# Patient Record
Sex: MALE | Race: BLACK OR AFRICAN AMERICAN | NOT HISPANIC OR LATINO | URBAN - METROPOLITAN AREA
[De-identification: names, ages, dates, MRNs, and addresses within clinical notes are randomized per-mention and may not be internally consistent; named-entity substitution may affect disease eponyms.]

---

## 2023-10-30 PROBLEM — Z86.39 HISTORY OF OBESITY: Status: ACTIVE | Noted: 2023-10-30

## 2023-10-30 PROBLEM — K59.09 CHRONIC CONSTIPATION: Status: ACTIVE | Noted: 2023-10-30

## 2023-10-30 PROBLEM — J30.9 ALLERGIC RHINITIS: Status: ACTIVE | Noted: 2023-10-30

## 2023-10-30 PROBLEM — F84.0 ACTIVE AUTISTIC DISORDER (HHS-HCC): Status: ACTIVE | Noted: 2023-10-30

## 2023-10-30 PROBLEM — H17.9 LEFT CORNEA SCAR: Status: ACTIVE | Noted: 2023-10-30

## 2023-10-30 PROBLEM — F71 MODERATE INTELLECTUAL DISABILITY: Status: ACTIVE | Noted: 2023-10-30

## 2023-10-30 PROBLEM — J45.909 ASTHMA (HHS-HCC): Status: ACTIVE | Noted: 2023-10-30

## 2023-10-30 PROBLEM — E66.9 OBESITY: Status: ACTIVE | Noted: 2023-10-30

## 2023-10-30 PROBLEM — H52.203 MYOPIA OF BOTH EYES WITH ASTIGMATISM: Status: ACTIVE | Noted: 2023-10-30

## 2023-10-30 PROBLEM — L23.9 CONTACT DERMATITIS, ALLERGIC: Status: ACTIVE | Noted: 2023-10-30

## 2023-10-30 PROBLEM — H52.13 MYOPIA OF BOTH EYES WITH ASTIGMATISM: Status: ACTIVE | Noted: 2023-10-30

## 2023-10-30 RX ORDER — TRIAMCINOLONE ACETONIDE 1 MG/G
CREAM TOPICAL
COMMUNITY
Start: 2015-10-28 | End: 2023-12-29 | Stop reason: WASHOUT

## 2023-10-30 RX ORDER — KETOCONAZOLE 20 MG/ML
SHAMPOO, SUSPENSION TOPICAL
COMMUNITY
Start: 2016-07-01 | End: 2023-12-29 | Stop reason: WASHOUT

## 2023-10-30 RX ORDER — CETIRIZINE HYDROCHLORIDE 10 MG/1
TABLET ORAL
COMMUNITY
Start: 2015-10-28 | End: 2023-12-29 | Stop reason: WASHOUT

## 2023-11-17 ENCOUNTER — APPOINTMENT (OUTPATIENT)
Dept: PRIMARY CARE | Facility: CLINIC | Age: 33
End: 2023-11-17
Payer: COMMERCIAL

## 2023-11-18 ENCOUNTER — OFFICE VISIT (OUTPATIENT)
Dept: OPHTHALMOLOGY | Facility: CLINIC | Age: 33
End: 2023-11-18
Payer: COMMERCIAL

## 2023-11-18 DIAGNOSIS — F84.0 ACTIVE AUTISTIC DISORDER (HHS-HCC): ICD-10-CM

## 2023-11-18 DIAGNOSIS — H17.9 LEFT CORNEA SCAR: Primary | ICD-10-CM

## 2023-11-18 DIAGNOSIS — H52.13 MYOPIA OF BOTH EYES WITH ASTIGMATISM: ICD-10-CM

## 2023-11-18 DIAGNOSIS — H52.203 MYOPIA OF BOTH EYES WITH ASTIGMATISM: ICD-10-CM

## 2023-11-18 PROCEDURE — 92014 COMPRE OPH EXAM EST PT 1/>: CPT | Performed by: OPTOMETRIST

## 2023-11-18 PROCEDURE — 92015 DETERMINE REFRACTIVE STATE: CPT | Performed by: OPTOMETRIST

## 2023-11-18 ASSESSMENT — REFRACTION
OS_SPHERE: -0.75
OD_CYLINDER: -2.50
OS_CYLINDER: -2.50
OD_SPHERE: -3.00
OD_AXIS: 180
OS_AXIS: 180

## 2023-11-18 ASSESSMENT — EXTERNAL EXAM - RIGHT EYE: OD_EXAM: NORMAL

## 2023-11-18 ASSESSMENT — VISUAL ACUITY
METHOD: SNELLEN - LINEAR
OD_CC: 20/40
OS_CC: 20/50
CORRECTION_TYPE: GLASSES
OD_CC+: -1

## 2023-11-18 ASSESSMENT — ENCOUNTER SYMPTOMS
CONSTITUTIONAL NEGATIVE: 0
HEMATOLOGIC/LYMPHATIC NEGATIVE: 0
ENDOCRINE NEGATIVE: 0
ALLERGIC/IMMUNOLOGIC NEGATIVE: 0
RESPIRATORY NEGATIVE: 0
PSYCHIATRIC NEGATIVE: 0
GASTROINTESTINAL NEGATIVE: 0
CARDIOVASCULAR NEGATIVE: 0
NEUROLOGICAL NEGATIVE: 0
MUSCULOSKELETAL NEGATIVE: 0
EYES NEGATIVE: 0

## 2023-11-18 ASSESSMENT — EXTERNAL EXAM - LEFT EYE: OS_EXAM: NORMAL

## 2023-11-18 ASSESSMENT — CONF VISUAL FIELD: COMMENTS: UNABLE TO TEST

## 2023-11-18 ASSESSMENT — REFRACTION_MANIFEST
OD_AXIS: 180
OD_SPHERE: -3.00
OS_AXIS: 180
OS_CYLINDER: -2.50
OS_SPHERE: -0.75
OD_CYLINDER: -2.00

## 2023-11-18 ASSESSMENT — SLIT LAMP EXAM - LIDS
COMMENTS: NORMAL
COMMENTS: NORMAL

## 2023-11-18 ASSESSMENT — TONOMETRY
OD_IOP_MMHG: 13
OS_IOP_MMHG: 12
IOP_METHOD: TONOPEN

## 2023-11-18 ASSESSMENT — REFRACTION_WEARINGRX
OD_SPHERE: -3.00
OD_CYLINDER: -2.00
OD_AXIS: 180
OS_CYLINDER: -2.50
OS_AXIS: 180
OS_SPHERE: -0.75

## 2023-11-18 ASSESSMENT — CUP TO DISC RATIO
OD_RATIO: 0.4
OS_RATIO: 0.4

## 2023-11-18 NOTE — PROGRESS NOTES
Reduced VA OU with healthy fundus and no cataract.  Has scar OS and 6 years ago VA was tested at 20/20 OD and OS despite scar present OS.  It is possible that VA was not recorded correctly in 2016.  It is possible that VA is changing.  Pt does not fixate according to instructions during the exam and may lack cognition to get good VA reading.  A spectacle prescription was given at the patient`s request.  VA today tested at 20/40 right eye (OD) 20/50 left eye (OS) and was 20/60 OD and 20/50 OS.  At this point feel that visual acuity (VA) measurement is stable and mildly variable and that 2016 visual acuity (VA) measurement at 20/20 right eye (OD) and left eye (OS) is incorrect.  A spectacle prescription was dispensed to be used as needed. Minor change right eye (OD).   The patient was asked to return to our clinic in one year or sooner if ocular or vision changes occur.

## 2023-12-29 ENCOUNTER — OFFICE VISIT (OUTPATIENT)
Dept: PRIMARY CARE | Facility: CLINIC | Age: 33
End: 2023-12-29
Payer: COMMERCIAL

## 2023-12-29 VITALS
WEIGHT: 289.8 LBS | OXYGEN SATURATION: 100 % | HEART RATE: 79 BPM | DIASTOLIC BLOOD PRESSURE: 77 MMHG | SYSTOLIC BLOOD PRESSURE: 124 MMHG | HEIGHT: 72 IN | BODY MASS INDEX: 39.25 KG/M2

## 2023-12-29 DIAGNOSIS — Z13.31 DEPRESSION SCREENING: ICD-10-CM

## 2023-12-29 DIAGNOSIS — Z00.00 MEDICARE ANNUAL WELLNESS VISIT, SUBSEQUENT: Primary | ICD-10-CM

## 2023-12-29 DIAGNOSIS — Z13.6 SCREENING FOR CARDIOVASCULAR CONDITION: ICD-10-CM

## 2023-12-29 DIAGNOSIS — Z23 NEED FOR INFLUENZA VACCINATION: ICD-10-CM

## 2023-12-29 PROCEDURE — G0008 ADMIN INFLUENZA VIRUS VAC: HCPCS | Performed by: NURSE PRACTITIONER

## 2023-12-29 PROCEDURE — 1036F TOBACCO NON-USER: CPT | Performed by: NURSE PRACTITIONER

## 2023-12-29 PROCEDURE — G0444 DEPRESSION SCREEN ANNUAL: HCPCS | Performed by: NURSE PRACTITIONER

## 2023-12-29 PROCEDURE — G0439 PPPS, SUBSEQ VISIT: HCPCS | Performed by: NURSE PRACTITIONER

## 2023-12-29 PROCEDURE — 90686 IIV4 VACC NO PRSV 0.5 ML IM: CPT | Performed by: NURSE PRACTITIONER

## 2023-12-29 ASSESSMENT — ACTIVITIES OF DAILY LIVING (ADL)
MANAGING_FINANCES: TOTAL CARE
DOING_HOUSEWORK: NEEDS ASSISTANCE
DRESSING: INDEPENDENT
BATHING: INDEPENDENT
TAKING_MEDICATION: NEEDS ASSISTANCE
GROCERY_SHOPPING: NEEDS ASSISTANCE

## 2023-12-29 ASSESSMENT — PATIENT HEALTH QUESTIONNAIRE - PHQ9
SUM OF ALL RESPONSES TO PHQ9 QUESTIONS 1 AND 2: 0
1. LITTLE INTEREST OR PLEASURE IN DOING THINGS: NOT AT ALL
2. FEELING DOWN, DEPRESSED OR HOPELESS: NOT AT ALL

## 2023-12-29 ASSESSMENT — ENCOUNTER SYMPTOMS
OCCASIONAL FEELINGS OF UNSTEADINESS: 0
DEPRESSION: 0
LOSS OF SENSATION IN FEET: 0

## 2023-12-29 NOTE — PROGRESS NOTES
Subjective   Reason for Visit: Geovanni Rowell III is an 33 y.o. male here with his caregiver for a Medicare Wellness visit.     HPI    Patient Care Team:  CHHAYA Whittington as PCP - General  CHHAYA Whittington as PCP - [employer] PCP     Review of Systems    Gen: denies fever, chills, weight loss, fatigue  HEENT: denies sinus pressure, sinus congestion, runny nose, red eyes, itchy eyes, vision loss, ear pain, hearing loss, throat pain, trouble swallowing  Neck: denies neck pain, neck swelling or masses  CV: denies chest pain, palpitations, fast heart rate, syncope  Resp: denies shortness of breath, cough, wheezing  GI: denies abdominal pain, nausea, diarrhea, constipation, hematochezia, melena  : denies dysuria, hematuria, penile discharge, frequency  Endo: denies polydipsia, polyuria, heat/cold intolerance, weight change, hair thinning  Heme: denies easy bruising, easy bleeding  Neuro: denies headache, numbness, tingling, memory loss, changes in vision  MSK: denies joint pain, joint swelling, weakness  Psych: denies suicidal ideation, homicidal ideation, depression, anxiety, mood swings  Skin: denies rashes, abnormal lesions, itching, changes in moles    Objective   Vitals:  /77   Pulse 79   Ht 1.829 m (6')   Wt 131 kg (289 lb 12.8 oz)   SpO2 100%   BMI 39.30 kg/m²       Physical Exam  No physical exam was completed today.     Assessment/Plan   Medicare Wellness Exam  - Flu vaccine given, otherwise up to date on vaccines  - Labs: lipid panel, BMP  - Depression screening negative  - Maintain healthy lifestyle    RTC in 1 year for Medicare Wellness Exam and physical or sooner prn    CHHAYA Lanza  [facility]

## 2024-03-05 ENCOUNTER — APPOINTMENT (OUTPATIENT)
Dept: OPHTHALMOLOGY | Facility: CLINIC | Age: 34
End: 2024-03-05
Payer: COMMERCIAL

## 2024-04-08 ENCOUNTER — LAB (OUTPATIENT)
Dept: LAB | Facility: LAB | Age: 34
End: 2024-04-08
Payer: COMMERCIAL

## 2024-04-08 DIAGNOSIS — Z13.6 SCREENING FOR CARDIOVASCULAR CONDITION: ICD-10-CM

## 2024-04-08 LAB
ANION GAP SERPL CALC-SCNC: 11 MMOL/L (ref 10–20)
BUN SERPL-MCNC: 16 MG/DL (ref 6–23)
CALCIUM SERPL-MCNC: 9.7 MG/DL (ref 8.6–10.6)
CHLORIDE SERPL-SCNC: 108 MMOL/L (ref 98–107)
CHOLEST SERPL-MCNC: 135 MG/DL (ref 0–199)
CHOLESTEROL/HDL RATIO: 3
CO2 SERPL-SCNC: 27 MMOL/L (ref 21–32)
CREAT SERPL-MCNC: 1.06 MG/DL (ref 0.5–1.3)
EGFRCR SERPLBLD CKD-EPI 2021: >90 ML/MIN/1.73M*2
GLUCOSE SERPL-MCNC: 94 MG/DL (ref 74–99)
HDLC SERPL-MCNC: 44.4 MG/DL
LDLC SERPL CALC-MCNC: 84 MG/DL
NON HDL CHOLESTEROL: 91 MG/DL (ref 0–149)
POTASSIUM SERPL-SCNC: 4.4 MMOL/L (ref 3.5–5.3)
SODIUM SERPL-SCNC: 142 MMOL/L (ref 136–145)
TRIGL SERPL-MCNC: 33 MG/DL (ref 0–149)
VLDL: 7 MG/DL (ref 0–40)

## 2024-04-08 PROCEDURE — 36415 COLL VENOUS BLD VENIPUNCTURE: CPT

## 2024-04-08 PROCEDURE — 80061 LIPID PANEL: CPT

## 2024-04-08 PROCEDURE — 80048 BASIC METABOLIC PNL TOTAL CA: CPT

## 2024-04-09 ENCOUNTER — TELEPHONE (OUTPATIENT)
Dept: PRIMARY CARE | Facility: CLINIC | Age: 34
End: 2024-04-09
Payer: COMMERCIAL

## 2024-04-09 NOTE — TELEPHONE ENCOUNTER
Spoke to patient    ----- Message from CHHAYA Whittington sent at 4/9/2024 12:52 PM EDT -----  Please inform patient that all of his labs look good

## 2024-11-20 ENCOUNTER — APPOINTMENT (OUTPATIENT)
Dept: OPHTHALMOLOGY | Facility: CLINIC | Age: 34
End: 2024-11-20
Payer: COMMERCIAL

## 2024-11-20 DIAGNOSIS — H52.13 MYOPIA OF BOTH EYES WITH ASTIGMATISM: ICD-10-CM

## 2024-11-20 DIAGNOSIS — H52.203 MYOPIA OF BOTH EYES WITH ASTIGMATISM: ICD-10-CM

## 2024-11-20 DIAGNOSIS — H17.9 LEFT CORNEA SCAR: Primary | ICD-10-CM

## 2024-11-20 PROCEDURE — 92015 DETERMINE REFRACTIVE STATE: CPT | Performed by: OPTOMETRIST

## 2024-11-20 PROCEDURE — 92014 COMPRE OPH EXAM EST PT 1/>: CPT | Performed by: OPTOMETRIST

## 2024-11-20 ASSESSMENT — VISUAL ACUITY
METHOD: SNELLEN - LINEAR
OD_CC: 20/30
OS_CC: 20/50
CORRECTION_TYPE: GLASSES

## 2024-11-20 ASSESSMENT — REFRACTION_MANIFEST
OD_AXIS: 180
METHOD_AUTOREFRACTION: 1
OD_CYLINDER: -4.75
OD_AXIS: 001
OS_SPHERE: -2.75
OD_SPHERE: -3.75
OS_SPHERE: -2.75
OD_SPHERE: -3.75
OD_CYLINDER: -4.75
OS_CYLINDER: -4.50
OS_CYLINDER: -4.50
OS_AXIS: 002
OS_AXIS: 180

## 2024-11-20 ASSESSMENT — ENCOUNTER SYMPTOMS
ALLERGIC/IMMUNOLOGIC NEGATIVE: 0
PSYCHIATRIC NEGATIVE: 0
NEUROLOGICAL NEGATIVE: 0
ENDOCRINE NEGATIVE: 0
CONSTITUTIONAL NEGATIVE: 0
CARDIOVASCULAR NEGATIVE: 0
MUSCULOSKELETAL NEGATIVE: 0
HEMATOLOGIC/LYMPHATIC NEGATIVE: 0
EYES NEGATIVE: 1
RESPIRATORY NEGATIVE: 0
GASTROINTESTINAL NEGATIVE: 0

## 2024-11-20 ASSESSMENT — REFRACTION_WEARINGRX
OS_SPHERE: -0.75
OD_CYLINDER: -2.50
SPECS_TYPE: TRIVEX OR POLYCARBONATE
OD_AXIS: 180
OS_AXIS: 180
OD_SPHERE: -3.00
OS_CYLINDER: -2.50

## 2024-11-20 ASSESSMENT — CONF VISUAL FIELD: COMMENTS: UTO

## 2024-11-20 ASSESSMENT — CUP TO DISC RATIO
OD_RATIO: 0.4
OS_RATIO: 0.4

## 2024-11-20 ASSESSMENT — TONOMETRY
OS_IOP_MMHG: 16
OD_IOP_MMHG: 19
IOP_METHOD: TONOPEN

## 2024-11-20 ASSESSMENT — EXTERNAL EXAM - RIGHT EYE: OD_EXAM: NORMAL

## 2024-11-20 ASSESSMENT — EXTERNAL EXAM - LEFT EYE: OS_EXAM: NORMAL

## 2024-11-20 ASSESSMENT — SLIT LAMP EXAM - LIDS
COMMENTS: NORMAL
COMMENTS: NORMAL

## 2024-11-20 NOTE — PROGRESS NOTES
Reduced VA OU with healthy fundus and no cataract.      Has scar OS and 6 years ago VA was tested at 20/20 OD and OS despite scar present OS.  It is possible that VA was not recorded correctly in 2016.  It is possible that VA is changing.      Pt does not fixate according to instructions during the exam and may lack cognition to get good VA reading.  A spectacle prescription was given at the patient`s request.      VA today tested at 20/40 20/50 OD/OS and stable compared to last year.  At this point feel that visual acuity (VA) measurement is stable and mildly variable and that 2016 visual acuity (VA) measurement at 20/20 right eye (OD) and left eye (OS) is incorrect.    A spectacle prescription was dispensed to be used as needed. Reduced Rx due to manifest refraction (MR) to avoid asthenopia. Due to mild amblyopia OU and corneal scar OS have some leeway to adjust Rx and not affect BCVA.   The patient was asked to return to our clinic in one year or sooner if ocular or vision changes occur.

## 2024-12-27 ENCOUNTER — APPOINTMENT (OUTPATIENT)
Dept: PRIMARY CARE | Facility: CLINIC | Age: 34
End: 2024-12-27
Payer: COMMERCIAL

## 2025-03-18 ENCOUNTER — APPOINTMENT (OUTPATIENT)
Dept: PRIMARY CARE | Facility: CLINIC | Age: 35
End: 2025-03-18
Payer: COMMERCIAL

## 2025-04-30 ENCOUNTER — APPOINTMENT (OUTPATIENT)
Dept: PRIMARY CARE | Facility: CLINIC | Age: 35
End: 2025-04-30
Payer: COMMERCIAL

## 2025-04-30 VITALS
HEART RATE: 90 BPM | SYSTOLIC BLOOD PRESSURE: 130 MMHG | WEIGHT: 292 LBS | BODY MASS INDEX: 37.47 KG/M2 | DIASTOLIC BLOOD PRESSURE: 90 MMHG | HEIGHT: 74 IN

## 2025-04-30 DIAGNOSIS — Z00.00 MEDICARE ANNUAL WELLNESS VISIT, SUBSEQUENT: Primary | ICD-10-CM

## 2025-04-30 PROCEDURE — 3008F BODY MASS INDEX DOCD: CPT | Performed by: FAMILY MEDICINE

## 2025-04-30 PROCEDURE — G0439 PPPS, SUBSEQ VISIT: HCPCS | Performed by: FAMILY MEDICINE

## 2025-04-30 PROCEDURE — 1036F TOBACCO NON-USER: CPT | Performed by: FAMILY MEDICINE

## 2025-04-30 ASSESSMENT — ENCOUNTER SYMPTOMS
SEIZURES: 0
DEPRESSION: 0
CHOKING: 0
COUGH: 0
BRUISES/BLEEDS EASILY: 0
ARTHRALGIAS: 0
ABDOMINAL PAIN: 0
TREMORS: 0
EYE PAIN: 0
CHEST TIGHTNESS: 0
SHORTNESS OF BREATH: 0
ANAL BLEEDING: 0
HEADACHES: 0
HALLUCINATIONS: 0
FEVER: 0
LIGHT-HEADEDNESS: 0
DIZZINESS: 0
PALPITATIONS: 0
SINUS PRESSURE: 0
LOSS OF SENSATION IN FEET: 0
NUMBNESS: 0
WEAKNESS: 0
BACK PAIN: 0
WHEEZING: 0
OCCASIONAL FEELINGS OF UNSTEADINESS: 0
DIFFICULTY URINATING: 0
CHILLS: 0
SINUS PAIN: 0
HEMATURIA: 0

## 2025-04-30 ASSESSMENT — PATIENT HEALTH QUESTIONNAIRE - PHQ9
SUM OF ALL RESPONSES TO PHQ9 QUESTIONS 1 AND 2: 0
SUM OF ALL RESPONSES TO PHQ9 QUESTIONS 1 AND 2: 0
2. FEELING DOWN, DEPRESSED OR HOPELESS: NOT AT ALL
1. LITTLE INTEREST OR PLEASURE IN DOING THINGS: NOT AT ALL
2. FEELING DOWN, DEPRESSED OR HOPELESS: NOT AT ALL
1. LITTLE INTEREST OR PLEASURE IN DOING THINGS: NOT AT ALL

## 2025-04-30 ASSESSMENT — ACTIVITIES OF DAILY LIVING (ADL)
MANAGING_FINANCES: TOTAL CARE
BATHING: INDEPENDENT
TAKING_MEDICATION: TOTAL CARE
GROCERY_SHOPPING: TOTAL CARE
DRESSING: INDEPENDENT
DOING_HOUSEWORK: INDEPENDENT

## 2025-04-30 NOTE — PROGRESS NOTES
"Patient presents for physical;  Patient has no concerns.    Subjective   Reason for Visit: Geovanni Rowell III is an 34 y.o. male here for a Medicare Wellness visit.      Sister with pt today.    Autism history. Sister states pt has NO cognitive impairment.     Reviewed all medications by prescribing practitioner or clinical pharmacist (such as prescriptions, OTCs, herbal therapies and supplements) and documented in the medical record.    HPI    Patient Care Team:  Jigar Jennings DO as PCP - General (Sports Medicine)  Philip Mcfadden OD as Consulting Physician (Optometry)     Review of Systems   Constitutional:  Negative for chills and fever.   HENT:  Negative for ear pain, nosebleeds, sinus pressure and sinus pain.    Eyes:  Negative for pain.   Respiratory:  Negative for cough, choking, chest tightness, shortness of breath and wheezing.    Cardiovascular:  Negative for chest pain, palpitations and leg swelling.   Gastrointestinal:  Negative for abdominal pain and anal bleeding.   Endocrine: Negative for cold intolerance and heat intolerance.   Genitourinary:  Negative for difficulty urinating and hematuria.   Musculoskeletal:  Negative for arthralgias, back pain and gait problem.   Skin:  Negative for rash.   Neurological:  Negative for dizziness, tremors, seizures, syncope, weakness, light-headedness, numbness and headaches.   Hematological:  Does not bruise/bleed easily.   Psychiatric/Behavioral:  Negative for behavioral problems, hallucinations, self-injury and suicidal ideas.        Objective   Vitals:  /90 (BP Location: Left arm, Patient Position: Sitting, BP Cuff Size: Large adult)   Pulse 90   Ht 1.88 m (6' 2\")   Wt 132 kg (292 lb)   BMI 37.49 kg/m²       Physical Exam  HENT:      Head: Normocephalic.   Cardiovascular:      Rate and Rhythm: Normal rate and regular rhythm.   Pulmonary:      Effort: Pulmonary effort is normal.   Abdominal:      Palpations: Abdomen is soft. "   Neurological:      Mental Status: He is alert.         Assessment & Plan  Medicare annual wellness visit, subsequent              Medicare wellness visit.    Not flu season now.    No specialists that he currently sees.     Fasting lab work was ordered today. It is likely that your insurance will cover the testing, but if you have any concerns- please check with your insurance company before getting the blood work drawn. I will call you when I get the results.   Please do not eat for 12 hours before getting your blood work drawn (water is ok).     Pt will follow up with new PCP

## 2025-05-27 ENCOUNTER — APPOINTMENT (OUTPATIENT)
Dept: PRIMARY CARE | Facility: CLINIC | Age: 35
End: 2025-05-27
Payer: COMMERCIAL

## 2025-06-05 ENCOUNTER — APPOINTMENT (OUTPATIENT)
Dept: PRIMARY CARE | Facility: CLINIC | Age: 35
End: 2025-06-05
Payer: COMMERCIAL

## 2025-12-03 ENCOUNTER — APPOINTMENT (OUTPATIENT)
Dept: OPHTHALMOLOGY | Facility: CLINIC | Age: 35
End: 2025-12-03
Payer: COMMERCIAL